# Patient Record
Sex: MALE | Race: WHITE
[De-identification: names, ages, dates, MRNs, and addresses within clinical notes are randomized per-mention and may not be internally consistent; named-entity substitution may affect disease eponyms.]

---

## 2018-02-15 ENCOUNTER — HOSPITAL ENCOUNTER (EMERGENCY)
Dept: HOSPITAL 95 - ER | Age: 83
Discharge: LEFT BEFORE DISCHARGE | End: 2018-02-15
Payer: MEDICARE

## 2018-02-15 DIAGNOSIS — Z53.21: Primary | ICD-10-CM

## 2018-05-16 ENCOUNTER — HOSPITAL ENCOUNTER (EMERGENCY)
Dept: HOSPITAL 95 - ER | Age: 83
Discharge: HOME | End: 2018-05-16
Payer: MEDICARE

## 2018-05-16 VITALS — WEIGHT: 187 LBS | HEIGHT: 68 IN | BODY MASS INDEX: 28.34 KG/M2

## 2018-05-16 DIAGNOSIS — Z88.2: ICD-10-CM

## 2018-05-16 DIAGNOSIS — Z79.899: ICD-10-CM

## 2018-05-16 DIAGNOSIS — Z88.8: ICD-10-CM

## 2018-05-16 DIAGNOSIS — Z79.82: ICD-10-CM

## 2018-05-16 DIAGNOSIS — I25.2: ICD-10-CM

## 2018-05-16 DIAGNOSIS — Z88.1: ICD-10-CM

## 2018-05-16 DIAGNOSIS — Z87.891: ICD-10-CM

## 2018-05-16 DIAGNOSIS — Z88.5: ICD-10-CM

## 2018-05-16 DIAGNOSIS — K21.9: ICD-10-CM

## 2018-05-16 DIAGNOSIS — Z79.2: ICD-10-CM

## 2018-05-16 DIAGNOSIS — E78.5: ICD-10-CM

## 2018-05-16 DIAGNOSIS — N39.0: Primary | ICD-10-CM

## 2018-05-16 LAB
LEUKOCYTE ESTERASE UR QL STRIP: (no result)
PROT UR STRIP-MCNC: (no result) MG/DL
RBC #/AREA URNS HPF: (no result) /HPF (ref 0–2)
SP GR SPEC: 1.01 (ref 1–1.02)
UROBILINOGEN UR STRIP-MCNC: (no result) MG/DL
WBC #/AREA URNS HPF: (no result) /HPF (ref 0–5)

## 2018-05-29 ENCOUNTER — HOSPITAL ENCOUNTER (EMERGENCY)
Age: 83
Discharge: HOME | End: 2018-05-29

## 2018-08-09 ENCOUNTER — HOSPITAL ENCOUNTER (EMERGENCY)
Dept: HOSPITAL 95 - ER | Age: 83
Discharge: HOME | End: 2018-08-09
Payer: MEDICARE

## 2018-08-09 VITALS — HEIGHT: 68 IN | WEIGHT: 170 LBS | BODY MASS INDEX: 25.76 KG/M2

## 2018-08-09 DIAGNOSIS — Z88.5: ICD-10-CM

## 2018-08-09 DIAGNOSIS — Z79.899: ICD-10-CM

## 2018-08-09 DIAGNOSIS — Z86.73: ICD-10-CM

## 2018-08-09 DIAGNOSIS — Z88.8: ICD-10-CM

## 2018-08-09 DIAGNOSIS — I25.2: ICD-10-CM

## 2018-08-09 DIAGNOSIS — Z88.2: ICD-10-CM
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
## 2018-08-09 DIAGNOSIS — Z88.1: ICD-10-CM

## 2018-08-09 DIAGNOSIS — Z79.82: ICD-10-CM

## 2018-08-09 DIAGNOSIS — K21.9: ICD-10-CM

## 2018-08-09 DIAGNOSIS — E78.5: ICD-10-CM

## 2018-08-09 DIAGNOSIS — S20.211A: Primary | ICD-10-CM

## 2018-08-30 ENCOUNTER — HOSPITAL ENCOUNTER (EMERGENCY)
Dept: HOSPITAL 95 - ER | Age: 83
Discharge: HOME | End: 2018-08-30
Payer: MEDICARE

## 2018-08-30 VITALS — WEIGHT: 180.01 LBS | BODY MASS INDEX: 26.66 KG/M2 | HEIGHT: 69 IN

## 2018-08-30 DIAGNOSIS — K21.9: ICD-10-CM

## 2018-08-30 DIAGNOSIS — J44.1: Primary | ICD-10-CM

## 2018-08-30 DIAGNOSIS — Z79.51: ICD-10-CM

## 2018-08-30 DIAGNOSIS — I25.2: ICD-10-CM

## 2018-08-30 DIAGNOSIS — Z79.899: ICD-10-CM

## 2018-08-30 DIAGNOSIS — Z86.73: ICD-10-CM

## 2018-08-30 DIAGNOSIS — E78.5: ICD-10-CM

## 2018-08-30 DIAGNOSIS — Z87.891: ICD-10-CM

## 2018-08-30 LAB
ALBUMIN SERPL BCP-MCNC: 3.7 G/DL (ref 3.4–5)
ALBUMIN/GLOB SERPL: 0.9 {RATIO} (ref 0.8–1.8)
ALT SERPL W P-5'-P-CCNC: 18 U/L (ref 12–78)
ANION GAP SERPL CALCULATED.4IONS-SCNC: 5 MMOL/L (ref 6–16)
AST SERPL W P-5'-P-CCNC: 16 U/L (ref 12–37)
BASOPHILS # BLD AUTO: 0.05 K/MM3 (ref 0–0.23)
BASOPHILS NFR BLD AUTO: 1 % (ref 0–2)
BILIRUB SERPL-MCNC: 0.5 MG/DL (ref 0.1–1)
BUN SERPL-MCNC: 20 MG/DL (ref 8–24)
CALCIUM SERPL-MCNC: 8.6 MG/DL (ref 8.5–10.1)
CHLORIDE SERPL-SCNC: 106 MMOL/L (ref 98–108)
CO2 SERPL-SCNC: 29 MMOL/L (ref 21–32)
CREAT SERPL-MCNC: 1.3 MG/DL (ref 0.6–1.2)
DEPRECATED RDW RBC AUTO: 44.3 FL (ref 35.1–46.3)
EOSINOPHIL # BLD AUTO: 0.3 K/MM3 (ref 0–0.68)
EOSINOPHIL NFR BLD AUTO: 4 % (ref 0–6)
ERYTHROCYTE [DISTWIDTH] IN BLOOD BY AUTOMATED COUNT: 12.7 % (ref 11.7–14.2)
GLOBULIN SER CALC-MCNC: 4.2 G/DL (ref 2.2–4)
GLUCOSE SERPL-MCNC: 105 MG/DL (ref 70–99)
HCT VFR BLD AUTO: 45.3 % (ref 37–53)
HGB BLD-MCNC: 14.9 G/DL (ref 13.5–17.5)
IMM GRANULOCYTES # BLD AUTO: 0.02 K/MM3 (ref 0–0.1)
IMM GRANULOCYTES NFR BLD AUTO: 0 % (ref 0–1)
LYMPHOCYTES # BLD AUTO: 1.4 K/MM3 (ref 0.84–5.2)
LYMPHOCYTES NFR BLD AUTO: 20 % (ref 21–46)
MCHC RBC AUTO-ENTMCNC: 32.9 G/DL (ref 31.5–36.5)
MCV RBC AUTO: 95 FL (ref 80–100)
MONOCYTES # BLD AUTO: 0.64 K/MM3 (ref 0.16–1.47)
MONOCYTES NFR BLD AUTO: 9 % (ref 4–13)
NEUTROPHILS # BLD AUTO: 4.6 K/MM3 (ref 1.96–9.15)
NEUTROPHILS NFR BLD AUTO: 66 % (ref 41–73)
NRBC # BLD AUTO: 0 K/MM3 (ref 0–0.02)
NRBC BLD AUTO-RTO: 0 /100 WBC (ref 0–0.2)
PLATELET # BLD AUTO: 172 K/MM3 (ref 150–400)
POTASSIUM SERPL-SCNC: 4.3 MMOL/L (ref 3.5–5.5)
PROT SERPL-MCNC: 7.9 G/DL (ref 6.4–8.2)
SODIUM SERPL-SCNC: 140 MMOL/L (ref 136–145)
TROPONIN I SERPL-MCNC: 0.04 NG/ML (ref 0–0.04)

## 2019-04-09 ENCOUNTER — HOSPITAL ENCOUNTER (EMERGENCY)
Dept: HOSPITAL 95 - ER | Age: 84
Discharge: HOME | End: 2019-04-09
Payer: MEDICARE

## 2019-04-09 VITALS — BODY MASS INDEX: 28.79 KG/M2 | WEIGHT: 189.99 LBS | HEIGHT: 68 IN

## 2019-04-09 DIAGNOSIS — F41.9: ICD-10-CM

## 2019-04-09 DIAGNOSIS — J18.9: Primary | ICD-10-CM

## 2019-04-09 DIAGNOSIS — K21.9: ICD-10-CM

## 2019-04-09 DIAGNOSIS — Z79.52: ICD-10-CM

## 2019-04-09 DIAGNOSIS — Z79.899: ICD-10-CM

## 2019-04-09 DIAGNOSIS — Z87.891: ICD-10-CM

## 2019-04-09 DIAGNOSIS — E78.5: ICD-10-CM

## 2019-04-09 DIAGNOSIS — Z88.1: ICD-10-CM

## 2019-04-09 DIAGNOSIS — Z88.8: ICD-10-CM

## 2019-04-09 DIAGNOSIS — J44.9: ICD-10-CM

## 2019-04-09 DIAGNOSIS — Z86.73: ICD-10-CM

## 2019-04-09 DIAGNOSIS — Z79.82: ICD-10-CM

## 2019-04-09 DIAGNOSIS — Z88.2: ICD-10-CM

## 2019-04-09 LAB
ALBUMIN SERPL BCP-MCNC: 3.2 G/DL (ref 3.4–5)
ALBUMIN/GLOB SERPL: 0.8 {RATIO} (ref 0.8–1.8)
ALT SERPL W P-5'-P-CCNC: 13 U/L (ref 12–78)
ANION GAP SERPL CALCULATED.4IONS-SCNC: 9 MMOL/L (ref 6–16)
AST SERPL W P-5'-P-CCNC: 11 U/L (ref 12–37)
BASOPHILS # BLD AUTO: 0.05 K/MM3 (ref 0–0.23)
BASOPHILS NFR BLD AUTO: 0 % (ref 0–2)
BILIRUB SERPL-MCNC: 0.6 MG/DL (ref 0.1–1)
BUN SERPL-MCNC: 27 MG/DL (ref 8–24)
CALCIUM SERPL-MCNC: 8.4 MG/DL (ref 8.5–10.1)
CHLORIDE SERPL-SCNC: 106 MMOL/L (ref 98–108)
CO2 SERPL-SCNC: 25 MMOL/L (ref 21–32)
CREAT SERPL-MCNC: 1.53 MG/DL (ref 0.6–1.2)
DEPRECATED RDW RBC AUTO: 47.4 FL (ref 35.1–46.3)
EOSINOPHIL # BLD AUTO: 0.04 K/MM3 (ref 0–0.68)
EOSINOPHIL NFR BLD AUTO: 0 % (ref 0–6)
ERYTHROCYTE [DISTWIDTH] IN BLOOD BY AUTOMATED COUNT: 13.2 % (ref 11.7–14.2)
GLOBULIN SER CALC-MCNC: 4 G/DL (ref 2.2–4)
GLUCOSE SERPL-MCNC: 112 MG/DL (ref 70–99)
HCT VFR BLD AUTO: 42.5 % (ref 37–53)
HGB BLD-MCNC: 13.9 G/DL (ref 13.5–17.5)
IMM GRANULOCYTES # BLD AUTO: 0.06 K/MM3 (ref 0–0.1)
IMM GRANULOCYTES NFR BLD AUTO: 0 % (ref 0–1)
LYMPHOCYTES # BLD AUTO: 1.3 K/MM3 (ref 0.84–5.2)
LYMPHOCYTES NFR BLD AUTO: 8 % (ref 21–46)
MCHC RBC AUTO-ENTMCNC: 32.7 G/DL (ref 31.5–36.5)
MCV RBC AUTO: 98 FL (ref 80–100)
MONOCYTES # BLD AUTO: 1.41 K/MM3 (ref 0.16–1.47)
MONOCYTES NFR BLD AUTO: 8 % (ref 4–13)
NEUTROPHILS # BLD AUTO: 14.01 K/MM3 (ref 1.96–9.15)
NEUTROPHILS NFR BLD AUTO: 83 % (ref 41–73)
NRBC # BLD AUTO: 0 K/MM3 (ref 0–0.02)
NRBC BLD AUTO-RTO: 0 /100 WBC (ref 0–0.2)
PLATELET # BLD AUTO: 143 K/MM3 (ref 150–400)
POTASSIUM SERPL-SCNC: 4.1 MMOL/L (ref 3.5–5.5)
PROT SERPL-MCNC: 7.2 G/DL (ref 6.4–8.2)
SODIUM SERPL-SCNC: 140 MMOL/L (ref 136–145)
TROPONIN I SERPL-MCNC: <0.015 NG/ML (ref 0–0.04)

## 2023-03-28 NOTE — NUR
SHIFT SUMMARY
 
PATIENT REMAINS ALERT, MOANING AND UNABLE TO FOLLOW COMMANDS. RIGHT SIDE
WEAKNES WITH HX CVA. MEDICATED FOR PAIN PER EMAR, PATIENT STILL MOANING. 02
SATS 98% ON 8L VIA NC, LS COARSE. NT SUCTIONED THICK LEMUS SPUTUM, PATIENT HAS
WEAK COUGH. HR SR 70s WITH PVCs AND PACs, IVAN DOWN TO THE 40s BRIEFLY. BP
WITH MAP >65. TEMP JO PATENT AND DRAINING MINIMAL GURMEET URINE, 30 MLS THIS
SHIFT. PATIENT REPOSITIONED Q2 HOURS.

## 2023-03-28 NOTE — NUR
ASSUMED CARE AT 1900
 
PATIENT IS RESTING COMFORTABLY. REPSONDING TO TOUCH. ON RA, 02 SATS IN THE
80s. JO REMIANS IN PLACE AND DRAINING MINIMAL AMOUNT OF URINE.
REPOSITIONED. CALL LIGHT IN REACH

## 2023-03-28 NOTE — NUR
Pt was admitted to ICU with aspiration pneumonia.
He has a long history of dementia, and is no longer verbal. Spoke
to pt's wife Karyna by phone this morning. She reports she recently placed
the pt "Bill" into [facility] as she was no longer able to meet his needs as
his dementia had worsened considerably.
  After discussing his current condition with wife, she has elected to begin
comfort care, and send him home to [facility] with [facility].
  Bedside RN Lisa and Care Manager Felicita aware, and message left for Dr. Barragan, requesting return call and orders for comfort care and d/c with
hospice.

## 2023-03-28 NOTE — NUR
SHIFT SUMMARY
PT STATUS CHANGED TO MEDICAL, COMFORT CARE. NO ACUTE CHANGES. RESPONSIVE TO
PAINFUL STIMULI. OCCASIONALLY MOANS. MEDICATED PER EMAR FOR PAIN. REMOVED O2,
DOES NOT APPEAR AIR HUNGRY. FAMILY VISITED. SELECTED [facility]. WILL CONTINUE TO
MONITOR UNTIL REPORT TO ONCOMING NURSE.

## 2023-03-28 NOTE — NUR
ASSUMED CARE
REPORT FROM FLAKITA ANDRADE AT 0700. PT RESTING IN BED. MOANING. DOES NOT RESPOND TO
VERBAL STIMULI. DOES NOT MAKE EYE CONTACT OR FOLLOW COMMANDS. WITHDRAWS FROM
PAINFUL STIMULI. NO MOVEMENT OR WITHDRAWAL TO RUE. LUNGS COARSE THROUGHOUT. 8L
VIA HFNC. O2 SATS >94%. MOIST COUGH. ORAL CARE PERFORMED. SR c PVCS ON
MONITOR, RATE 70'S. BP STABLE. ABD ROUND, SOFT, NON TENDER. BT X 4. NPO D/T
ASPIRATION RISK. JO PATENT, DRAINING GURMEET URINE TO GRAVITY. STAGE 2 ULCER
TO COCCXY, SEE WOUND ASSESSMENT. WILL CONTINUE TO MONITOR.

## 2023-03-28 NOTE — NUR
Spiritual Care Visit | Comfort Care -  HIRAL'S
Pt. is on comfort care. Spouse and granddaughter are present. After
introductions, faciltate a life review with the family memebrs.  Listen with
empathy, interest and calming presence. Rapport is established. As we talk,
the family shares they have made arrangements with the [employer]. As I
normalize the Pt. Experience the family chooses [facility] as their
 home.  Continue with rapport building, Scripture is read and prayers
are given. The family does not expect any other visitors.  The family
verbalizes gratitude for thee spiritual care visit. This  will
remain available throughout his shift.

## 2023-03-28 NOTE — NUR
Spiritual Care | Nurse request
Pt. has been recently identified as Comfort Care.  Spouse is not available to
be at bedside due to illness.  Pt. identifies as a man of haley. Though Pt. is
non-responsive, Prayed with Pt. and gave him a pastoral blessing. Will remain
available to the Pt. and family should they arrive before D/C.

## 2023-03-28 NOTE — NUR
PATIENT TO ROOM FROM ED AT 0020. SLID PATIENT O BED, BEDREST AT BASELINE.
PATIENT MOANING AND EYES OPEN BUT UNABLE TO FOLLOW COMMANDS AND NON VERBAL AT
BASELINE. HX DEMENTIA. 02 SATS 94% ON 7L VIA NC, LS COARSE TO DIMINISHED. RR
20s, PRODUCTIVE COUGH. HR SR 80s WITH PVCs. BP STABLE AT THIS TIME. TEMP JO
PATENT AND DRAINING TO GRVAITY, MINIMAL URINE OUTPUT, DARK GURMEET. PHOTO OF
WOUND ON COCCYX IN CHART. PATIENT REPOSITIONED AND RESTING.

## 2023-03-29 NOTE — NUR
SHIFT SUMMARY
 
PATIENT RESTING COMFORTABLY. PRN PAIN MEDICATION GIVEN, SEE EMAR. REPOSITIONED
FOR COMFORT. JO PATENT AND DRAINING TO GRAVITY. UPDATED FAMILY OVER THE
PHONE.

## 2023-03-29 NOTE — NUR
AT 1045 PATIENT STARTING HAVE MORE BRADYCARDIA.
AT 1050 HE HAS NO PULSE NO RR. STAFF WAS AT HIS BEDSIDE WHEN PATIENT PAST
AWAY. FAMILY WAS CALLED AND WILL BE AT BEDSIDE AT 2-3PM TODAY.
FINAL DC COMPLETED BY CHARGE RN. HOUSE SUPERVISOR AWARE AND [place] AWARE.

## 2023-03-29 NOTE — NUR
0800
assume care of patient
Patient resting in bed however did start moaning this am. Skin care done, pain
patch put to lower mid back, oxycodone orally given for general aches and
pains and comfort. pain scale stated 3/10 on FLACC. Patient has 68% Fio2 on
room air. Patient moves extrem. to stimuli, right pupil reactive to light size
2 and right eye not reactive to light size 4. Patient has course lung sounds
throughout. abd soft however hypobowel tones. patient has wound to coccyx with
mepilex on in place.. pictures in chart. patient has 2 piv both saline locked
flushed easily no blood return from either. edema to lower extrem. 2+ at
ankles. ppp x 4 extrem. skin warm to touch however dry and pale.
0900
spoke with Dr Vogel about care manager request for dc orders and side rail
orders in care home.
Patient was turned to right side and resting comfortable 30 post pain med
given.

## 2023-03-29 NOTE — NUR
Spiritual Care | EOL
Pt. passed away and this  was called to room. Prayed prayer of
blessing over the Pt. and provided a comfort care quilt behalf of the
hospital. Based on conversations on 3/28, the family has chosen [facility] for services.

## 2023-03-29 NOTE — NUR
Spiritual Care Visit (***see note for: [employer])
Pt. had  earlier in the day.  Spouse and daughter are ppresent.
Pastoral care and comfort are given to the faimly.  Family clarified that the
Pt. has made pre-paid arrangements with the [employer] for his remains.
Prayed with family. Family verbalized gratitude for the spiritual care visit.
This  gave contact information to the nursing staff.
 
***The protocol for picking up of the body of someone who has prepaid
arrangements with the [employer] is as follows... 1.  after the last
family member has left the room of the Pt, 2. nursing staff should call The
[facility]" in [place] ([phone]) who has the local
contract with the [employer]. 3. [employer] will contact a local 
home to  the .